# Patient Record
Sex: FEMALE | Race: WHITE | NOT HISPANIC OR LATINO | URBAN - METROPOLITAN AREA
[De-identification: names, ages, dates, MRNs, and addresses within clinical notes are randomized per-mention and may not be internally consistent; named-entity substitution may affect disease eponyms.]

---

## 2020-07-25 ENCOUNTER — TELEPHONE ENCOUNTER (OUTPATIENT)
Dept: URBAN - METROPOLITAN AREA CLINIC 13 | Facility: CLINIC | Age: 65
End: 2020-07-25

## 2020-07-25 RX ORDER — BENAZEPRIL HYDROCHLORIDE 10 MG/1
TAKE 1 TABLET DAILY TABLET, COATED ORAL
Refills: 0 | OUTPATIENT
Start: 2014-09-24 | End: 2015-09-14

## 2020-07-25 RX ORDER — LORAZEPAM 2 MG/1
TAKE 1 TABLET DAILY TABLET ORAL
Qty: 30 | Refills: 0 | OUTPATIENT
Start: 2014-08-02 | End: 2015-09-14

## 2020-07-25 RX ORDER — ALPRAZOLAM 2 MG/1
TAKE 1 TABLET DAILY AT BEDTIME TABLET ORAL
Refills: 0 | OUTPATIENT
Start: 2007-06-21 | End: 2015-07-08

## 2020-07-25 RX ORDER — METRONIDAZOLE 500 MG/1
TAKE 1 TABLET TWICE DAILY TABLET ORAL
Qty: 28 | Refills: 0 | OUTPATIENT
Start: 2015-07-17 | End: 2015-09-14

## 2020-07-26 ENCOUNTER — TELEPHONE ENCOUNTER (OUTPATIENT)
Dept: URBAN - METROPOLITAN AREA CLINIC 13 | Facility: CLINIC | Age: 65
End: 2020-07-26

## 2020-07-26 RX ORDER — PREDNISONE 20 MG/1
TABLET ORAL
Qty: 14 | Refills: 0 | Status: ACTIVE | COMMUNITY
Start: 2015-02-02

## 2020-07-26 RX ORDER — HYDROCODONE BITARTRATE AND HOMATROPINE METHYLBROMIDE 5; 1.5 MG/5ML; MG/5ML
SYRUP ORAL
Qty: 240 | Refills: 0 | Status: ACTIVE | COMMUNITY
Start: 2014-10-03

## 2020-07-26 RX ORDER — IBUPROFEN 800 MG/1
TABLET ORAL
Qty: 60 | Refills: 0 | Status: ACTIVE | COMMUNITY
Start: 2015-02-07

## 2020-07-26 RX ORDER — PREDNISONE 20 MG/1
TABLET ORAL
Qty: 14 | Refills: 0 | Status: ACTIVE | COMMUNITY
Start: 2014-10-28

## 2020-07-26 RX ORDER — HYDROCODONE BITARTRATE AND HOMATROPINE METHYLBROMIDE 5; 1.5 MG/5ML; MG/5ML
SYRUP ORAL
Qty: 180 | Refills: 0 | Status: ACTIVE | COMMUNITY
Start: 2014-10-28

## 2020-07-26 RX ORDER — IBUPROFEN 800 MG/1
TABLET ORAL
Qty: 90 | Refills: 0 | Status: ACTIVE | COMMUNITY
Start: 2015-05-20

## 2020-07-26 RX ORDER — PREDNISONE 20 MG/1
TABLET ORAL
Qty: 14 | Refills: 0 | Status: ACTIVE | COMMUNITY
Start: 2014-11-17

## 2020-07-26 RX ORDER — HYOSCYAMINE SULFATE 0.12 MG/1
TABLET ORAL
Qty: 60 | Refills: 0 | Status: ACTIVE | COMMUNITY
Start: 2015-06-15

## 2020-07-26 RX ORDER — DOXYCYCLINE 100 MG/1
CAPSULE ORAL
Qty: 20 | Refills: 0 | Status: ACTIVE | COMMUNITY
Start: 2014-10-28

## 2020-07-26 RX ORDER — VALACYCLOVIR HYDROCHLORIDE 500 MG/1
TABLET, FILM COATED ORAL
Qty: 30 | Refills: 0 | Status: ACTIVE | COMMUNITY
Start: 2014-07-17

## 2020-07-26 RX ORDER — OXYCODONE AND ACETAMINOPHEN 5; 325 MG/1; MG/1
TABLET ORAL
Qty: 48 | Refills: 0 | Status: ACTIVE | COMMUNITY
Start: 2015-02-07

## 2020-07-26 RX ORDER — IBUPROFEN 800 MG/1
TABLET ORAL
Qty: 90 | Refills: 0 | Status: ACTIVE | COMMUNITY
Start: 2015-04-12

## 2020-07-26 RX ORDER — CIPROFLOXACIN HYDROCHLORIDE 500 MG/1
TABLET, FILM COATED ORAL
Qty: 20 | Refills: 0 | Status: ACTIVE | COMMUNITY
Start: 2014-10-02

## 2020-07-26 RX ORDER — SULFAMETHOXAZOLE AND TRIMETHOPRIM 800; 160 MG/1; MG/1
TABLET ORAL
Qty: 14 | Refills: 0 | Status: ACTIVE | COMMUNITY
Start: 2015-04-13

## 2020-07-26 RX ORDER — BENZONATATE 200 MG/1
CAPSULE ORAL
Qty: 30 | Refills: 0 | Status: ACTIVE | COMMUNITY
Start: 2015-01-16

## 2020-07-26 RX ORDER — FLUOXETINE 20 MG/1
TAKE 1 CAPSULE DAILY CAPSULE ORAL
Refills: 0 | Status: ACTIVE | COMMUNITY
Start: 2014-10-17

## 2020-07-26 RX ORDER — CIPROFLOXACIN HYDROCHLORIDE 500 MG/1
TABLET, FILM COATED ORAL
Qty: 28 | Refills: 0 | Status: ACTIVE | COMMUNITY
Start: 2014-11-17

## 2020-07-26 RX ORDER — CIPROFLOXACIN HYDROCHLORIDE 500 MG/1
TABLET, FILM COATED ORAL
Qty: 14 | Refills: 0 | Status: ACTIVE | COMMUNITY
Start: 2014-10-14

## 2020-07-26 RX ORDER — OXYCODONE AND ACETAMINOPHEN 5; 325 MG/1; MG/1
TABLET ORAL
Qty: 30 | Refills: 0 | Status: ACTIVE | COMMUNITY
Start: 2015-02-22

## 2020-07-26 RX ORDER — CYCLOBENZAPRINE HYDROCHLORIDE 5 MG/1
TABLET, FILM COATED ORAL
Qty: 60 | Refills: 0 | Status: ACTIVE | COMMUNITY
Start: 2015-02-07

## 2020-07-26 RX ORDER — TRIAMTERENE AND HYDROCHLOROTHIAZIDE 37.5; 25 MG/1; MG/1
TABLET ORAL
Qty: 90 | Refills: 0 | Status: ACTIVE | COMMUNITY
Start: 2015-02-02

## 2020-07-26 RX ORDER — CLINDAMYCIN HYDROCHLORIDE 300 MG/1
CAPSULE ORAL
Qty: 21 | Refills: 0 | Status: ACTIVE | COMMUNITY
Start: 2014-09-24

## 2020-07-26 RX ORDER — TOPIRAMATE 50 MG/1
TAKE 1 TABLET DAILY TABLET, FILM COATED ORAL
Refills: 0 | Status: ACTIVE | COMMUNITY
Start: 2015-04-13

## 2020-07-26 RX ORDER — CLONAZEPAM 1 MG/1
TABLET ORAL
Qty: 60 | Refills: 0 | Status: ACTIVE | COMMUNITY
Start: 2014-09-24

## 2020-07-26 RX ORDER — AZITHROMYCIN DIHYDRATE 250 MG/1
TABLET, FILM COATED ORAL
Qty: 6 | Refills: 0 | Status: ACTIVE | COMMUNITY
Start: 2015-05-04

## 2020-07-26 RX ORDER — BENZONATATE 100 MG/1
CAPSULE ORAL
Qty: 30 | Refills: 0 | Status: ACTIVE | COMMUNITY
Start: 2015-04-13

## 2020-07-26 RX ORDER — OMEPRAZOLE 40 MG/1
TAKE 1 CAPSULE DAILY CAPSULE, DELAYED RELEASE ORAL
Refills: 0 | Status: ACTIVE | COMMUNITY
Start: 2015-02-12

## 2022-11-18 ENCOUNTER — WEB ENCOUNTER (OUTPATIENT)
Dept: URBAN - METROPOLITAN AREA CLINIC 113 | Facility: CLINIC | Age: 67
End: 2022-11-18

## 2022-11-18 ENCOUNTER — OFFICE VISIT (OUTPATIENT)
Dept: URBAN - METROPOLITAN AREA CLINIC 113 | Facility: CLINIC | Age: 67
End: 2022-11-18
Payer: MEDICARE

## 2022-11-18 ENCOUNTER — LAB OUTSIDE AN ENCOUNTER (OUTPATIENT)
Dept: URBAN - METROPOLITAN AREA CLINIC 113 | Facility: CLINIC | Age: 67
End: 2022-11-18

## 2022-11-18 VITALS
RESPIRATION RATE: 22 BRPM | WEIGHT: 293 LBS | DIASTOLIC BLOOD PRESSURE: 96 MMHG | TEMPERATURE: 97.1 F | SYSTOLIC BLOOD PRESSURE: 174 MMHG | HEART RATE: 91 BPM | BODY MASS INDEX: 48.82 KG/M2 | HEIGHT: 65 IN

## 2022-11-18 DIAGNOSIS — Z12.11 COLON CANCER SCREENING: ICD-10-CM

## 2022-11-18 DIAGNOSIS — R09.89 CHRONIC THROAT CLEARING: ICD-10-CM

## 2022-11-18 DIAGNOSIS — K21.9 GASTROESOPHAGEAL REFLUX DISEASE WITHOUT ESOPHAGITIS: ICD-10-CM

## 2022-11-18 PROCEDURE — 99214 OFFICE O/P EST MOD 30 MIN: CPT | Performed by: NURSE PRACTITIONER

## 2022-11-18 RX ORDER — FAMOTIDINE 40 MG/1
1 TABLET AT BEDTIME TABLET, FILM COATED ORAL ONCE A DAY
Status: ACTIVE | COMMUNITY

## 2022-11-18 RX ORDER — DICYCLOMINE HYDROCHLORIDE 10 MG/1
2 CAPSULES CAPSULE ORAL THREE TIMES A DAY
Status: ACTIVE | COMMUNITY

## 2022-11-18 RX ORDER — IBUPROFEN 800 MG/1
TABLET ORAL
Qty: 60 | Refills: 0 | Status: DISCONTINUED | COMMUNITY
Start: 2015-02-07

## 2022-11-18 RX ORDER — LOVASTATIN 20 MG/1
1 TABLET WITH THE EVENING MEAL TABLET ORAL ONCE A DAY
Status: ACTIVE | COMMUNITY

## 2022-11-18 RX ORDER — DOXYCYCLINE 100 MG/1
CAPSULE ORAL
Qty: 20 | Refills: 0 | Status: DISCONTINUED | COMMUNITY
Start: 2014-10-28

## 2022-11-18 RX ORDER — IBANDRONATE SODIUM 150 MG/1
1 TABLET TABLET, FILM COATED ORAL
Status: ACTIVE | COMMUNITY

## 2022-11-18 RX ORDER — HYDROCHLOROTHIAZIDE 25 MG/1
1 TABLET IN THE MORNING TABLET ORAL ONCE A DAY
Status: ACTIVE | COMMUNITY

## 2022-11-18 RX ORDER — OXYCODONE AND ACETAMINOPHEN 5; 325 MG/1; MG/1
TABLET ORAL
Qty: 48 | Refills: 0 | Status: DISCONTINUED | COMMUNITY
Start: 2015-02-07

## 2022-11-18 RX ORDER — FLUTICASONE PROPIONATE 50 UG/1
1 SPRAY IN EACH NOSTRIL SPRAY, METERED NASAL ONCE A DAY
Status: ACTIVE | COMMUNITY

## 2022-11-18 RX ORDER — OMEPRAZOLE 40 MG/1
TAKE 1 CAPSULE DAILY CAPSULE, DELAYED RELEASE ORAL
Refills: 0 | Status: DISCONTINUED | COMMUNITY
Start: 2015-02-12

## 2022-11-18 RX ORDER — FLUOXETINE 20 MG/1
TAKE 1 CAPSULE DAILY CAPSULE ORAL
Refills: 0 | Status: DISCONTINUED | COMMUNITY
Start: 2014-10-17

## 2022-11-18 RX ORDER — CYCLOBENZAPRINE HYDROCHLORIDE 5 MG/1
TABLET, FILM COATED ORAL
Qty: 60 | Refills: 0 | Status: DISCONTINUED | COMMUNITY
Start: 2015-02-07

## 2022-11-18 RX ORDER — BENZONATATE 200 MG/1
CAPSULE ORAL
Qty: 30 | Refills: 0 | Status: DISCONTINUED | COMMUNITY
Start: 2015-01-16

## 2022-11-18 RX ORDER — AZELASTINE HYDROCHLORIDE AND FLUTICASONE PROPIONATE 137; 50 UG/1; UG/1
1 SPRAY IN EACH NOSTRIL SPRAY, METERED NASAL TWICE A DAY
Status: ACTIVE | COMMUNITY

## 2022-11-18 RX ORDER — AZITHROMYCIN DIHYDRATE 250 MG/1
TABLET, FILM COATED ORAL
Qty: 6 | Refills: 0 | Status: DISCONTINUED | COMMUNITY
Start: 2015-05-04

## 2022-11-18 RX ORDER — AMLODIPINE BESYLATE 2.5 MG/1
1 TABLET TABLET ORAL ONCE A DAY
Status: ACTIVE | COMMUNITY

## 2022-11-18 RX ORDER — CLONAZEPAM 1 MG/1
TABLET ORAL
Qty: 60 | Refills: 0 | Status: DISCONTINUED | COMMUNITY
Start: 2014-09-24

## 2022-11-18 RX ORDER — HYDROCODONE BITARTRATE AND HOMATROPINE METHYLBROMIDE 5; 1.5 MG/5ML; MG/5ML
SYRUP ORAL
Qty: 240 | Refills: 0 | Status: DISCONTINUED | COMMUNITY
Start: 2014-10-03

## 2022-11-18 RX ORDER — PREDNISONE 20 MG/1
TABLET ORAL
Qty: 14 | Refills: 0 | Status: DISCONTINUED | COMMUNITY
Start: 2014-10-28

## 2022-11-18 RX ORDER — BENZONATATE 100 MG/1
CAPSULE ORAL
Qty: 30 | Refills: 0 | Status: DISCONTINUED | COMMUNITY
Start: 2015-04-13

## 2022-11-18 RX ORDER — TRIAMTERENE AND HYDROCHLOROTHIAZIDE 37.5; 25 MG/1; MG/1
TABLET ORAL
Qty: 90 | Refills: 0 | Status: DISCONTINUED | COMMUNITY
Start: 2015-02-02

## 2022-11-18 RX ORDER — SULFAMETHOXAZOLE AND TRIMETHOPRIM 800; 160 MG/1; MG/1
TABLET ORAL
Qty: 14 | Refills: 0 | Status: DISCONTINUED | COMMUNITY
Start: 2015-04-13

## 2022-11-18 RX ORDER — HYOSCYAMINE SULFATE 0.12 MG/1
TABLET ORAL
Qty: 60 | Refills: 0 | Status: DISCONTINUED | COMMUNITY
Start: 2015-06-15

## 2022-11-18 RX ORDER — TOPIRAMATE 50 MG/1
TAKE 1 TABLET DAILY TABLET, FILM COATED ORAL
Refills: 0 | Status: DISCONTINUED | COMMUNITY
Start: 2015-04-13

## 2022-11-18 RX ORDER — CLINDAMYCIN HYDROCHLORIDE 300 MG/1
CAPSULE ORAL
Qty: 21 | Refills: 0 | Status: DISCONTINUED | COMMUNITY
Start: 2014-09-24

## 2022-11-18 RX ORDER — VALACYCLOVIR HYDROCHLORIDE 500 MG/1
TABLET, FILM COATED ORAL
Qty: 30 | Refills: 0 | Status: DISCONTINUED | COMMUNITY
Start: 2014-07-17

## 2022-11-18 RX ORDER — TOPIRAMATE 50 MG/1
1 TABLET TABLET, FILM COATED ORAL ONCE A DAY
Status: ACTIVE | COMMUNITY

## 2022-11-18 RX ORDER — CIPROFLOXACIN HYDROCHLORIDE 500 MG/1
TABLET, FILM COATED ORAL
Qty: 20 | Refills: 0 | Status: DISCONTINUED | COMMUNITY
Start: 2014-10-02

## 2022-11-18 RX ORDER — MONTELUKAST 10 MG/1
1 TABLET TABLET, FILM COATED ORAL ONCE A DAY
Status: ACTIVE | COMMUNITY

## 2022-11-18 RX ORDER — OMEPRAZOLE 40 MG/1
1 CAPSULE 30 MINUTES BEFORE MORNING MEAL CAPSULE, DELAYED RELEASE ORAL EVERY OTHER DAY
Status: ACTIVE | COMMUNITY

## 2022-11-18 NOTE — HPI-TODAY'S VISIT:
This is a 67-year-old female with a history of hypertension, anxiety, colon diverticulosis, and diarrhea predominant irritable bowel syndrome presenting after a long interval for evaluation of acid reflux and mucus in her throat. She was last seen in 2015 for worsening diarrhea in the setting of diarrhea predominant IBS.  She had been treated presumptively for C. difficile with Flagyl and had persistent diarrhea.  A colonoscopy was previously recommended and was  to be rescheduled but was not performed.  She did not return for follow-up.  She states that she has been doing well until August.  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  She contracted it and had recurrence resulting in bronchiolitis and pneumonia.  She has been on steroids and antibiotics intermittently for the last 3 months.  She has been under the care of Dr. Maguire.  She is here because she has been frequently clearing her throat.  She feels as though there is a "pool of phlegm" in the bottom of her throat.  This began with respiratory issues.  In the mornings, she coughs producing a small amount of yellow mucus.  She states his mucus is difficult to produce.  Occasionally, throat clearing is worse with meals and occasionally at night she awakens and cannot stop coughing.  There is discussion this may be associated with acid reflux.  She has been evaluated by other physicians in order to ascertain a problem and has had a recent CT scan of her sinuses at [facility] revealing a deviated septum.  She has undergone a negative barium swallow per her primary care physician.  She denies esophageal symptoms of dysphagia but occasionally feels as though she may choke or that her throat is closing.  She is taking omeprazole 40 mg daily and has been on this chronically.  She denies heartburn or regurgitation.  She rarely has diarrhea.  She denies any other abdominal symptoms. She has recently been evaluated for abnormal renal function.  She is seeing Dr. Calabrese.  Her primary care physician stopped Voltaren topical, Boniva, and decreased omeprazole to every other day. She reports a normal colonoscopy at [facility] 5 years ago. She provides labs on her phone 11/7/2022:CBC: WBC 6.7, hemoglobin 14.1, MCV 91, platelet 263.  TSH 3.650.

## 2022-11-18 NOTE — HPI-OTHER HISTORIES
11/7/2022:CBC: WBC 6.7, hemoglobin 14.1, MCV 91, platelet 263.  TSH 3.650.Labs:  Colonoscopy 4/20/2007:Colon diverticulosis, otherwise normal to the terminal ileum.

## 2022-11-19 PROBLEM — 248589007: Status: ACTIVE | Noted: 2022-11-18

## 2022-11-19 PROBLEM — 266435005: Status: ACTIVE | Noted: 2022-11-18

## 2022-11-19 PROBLEM — 275978004 COLON CANCER SCREENING: Status: ACTIVE | Noted: 2022-11-19

## 2022-12-19 ENCOUNTER — OFFICE VISIT (OUTPATIENT)
Dept: URBAN - METROPOLITAN AREA MEDICAL CENTER 2 | Facility: MEDICAL CENTER | Age: 67
End: 2022-12-19
Payer: MEDICARE

## 2022-12-19 ENCOUNTER — CLAIMS CREATED FROM THE CLAIM WINDOW (OUTPATIENT)
Dept: URBAN - METROPOLITAN AREA MEDICAL CENTER 2 | Facility: MEDICAL CENTER | Age: 67
End: 2022-12-19

## 2022-12-19 DIAGNOSIS — K21.9 ACID REFLUX: ICD-10-CM

## 2022-12-19 DIAGNOSIS — K21.00 ALKALINE REFLUX ESOPHAGITIS: ICD-10-CM

## 2022-12-19 PROCEDURE — 91035 G-ESOPH REFLX TST W/ELECTROD: CPT | Performed by: INTERNAL MEDICINE

## 2022-12-19 PROCEDURE — 43235 EGD DIAGNOSTIC BRUSH WASH: CPT | Performed by: INTERNAL MEDICINE

## 2023-03-16 ENCOUNTER — WEB ENCOUNTER (OUTPATIENT)
Dept: URBAN - METROPOLITAN AREA CLINIC 113 | Facility: CLINIC | Age: 68
End: 2023-03-16

## 2023-03-20 ENCOUNTER — DASHBOARD ENCOUNTERS (OUTPATIENT)
Age: 68
End: 2023-03-20

## 2023-03-20 ENCOUNTER — OFFICE VISIT (OUTPATIENT)
Dept: URBAN - METROPOLITAN AREA CLINIC 113 | Facility: CLINIC | Age: 68
End: 2023-03-20
Payer: MEDICARE

## 2023-03-20 VITALS
DIASTOLIC BLOOD PRESSURE: 84 MMHG | HEIGHT: 65 IN | SYSTOLIC BLOOD PRESSURE: 148 MMHG | RESPIRATION RATE: 20 BRPM | BODY MASS INDEX: 48.82 KG/M2 | WEIGHT: 293 LBS | HEART RATE: 80 BPM | TEMPERATURE: 97.1 F

## 2023-03-20 DIAGNOSIS — K21.9 GASTROESOPHAGEAL REFLUX DISEASE WITHOUT ESOPHAGITIS: ICD-10-CM

## 2023-03-20 DIAGNOSIS — R09.89 CHRONIC THROAT CLEARING: ICD-10-CM

## 2023-03-20 DIAGNOSIS — Z12.11 COLON CANCER SCREENING: ICD-10-CM

## 2023-03-20 PROCEDURE — 99213 OFFICE O/P EST LOW 20 MIN: CPT | Performed by: INTERNAL MEDICINE

## 2023-03-20 RX ORDER — HYDROCHLOROTHIAZIDE 25 MG/1
1 TABLET IN THE MORNING TABLET ORAL ONCE A DAY
Status: ACTIVE | COMMUNITY

## 2023-03-20 RX ORDER — FLUTICASONE PROPIONATE 50 UG/1
1 SPRAY IN EACH NOSTRIL SPRAY, METERED NASAL ONCE A DAY
Status: ON HOLD | COMMUNITY

## 2023-03-20 RX ORDER — AMLODIPINE BESYLATE 2.5 MG/1
1 TABLET TABLET ORAL ONCE A DAY
Status: ACTIVE | COMMUNITY

## 2023-03-20 RX ORDER — DICYCLOMINE HYDROCHLORIDE 10 MG/1
2 CAPSULES CAPSULE ORAL THREE TIMES A DAY
Status: ACTIVE | COMMUNITY

## 2023-03-20 RX ORDER — LOVASTATIN 20 MG/1
1 TABLET WITH THE EVENING MEAL TABLET ORAL ONCE A DAY
Status: ACTIVE | COMMUNITY

## 2023-03-20 RX ORDER — TOPIRAMATE 50 MG/1
1 TABLET TABLET, FILM COATED ORAL ONCE A DAY
Status: ACTIVE | COMMUNITY

## 2023-03-20 RX ORDER — AZELASTINE HYDROCHLORIDE AND FLUTICASONE PROPIONATE 137; 50 UG/1; UG/1
1 SPRAY IN EACH NOSTRIL SPRAY, METERED NASAL TWICE A DAY
Status: ACTIVE | COMMUNITY

## 2023-03-20 RX ORDER — OMEPRAZOLE 40 MG/1
1 CAPSULE 30 MINUTES BEFORE MORNING MEAL CAPSULE, DELAYED RELEASE ORAL EVERY OTHER DAY
Status: ACTIVE | COMMUNITY

## 2023-03-20 RX ORDER — IBANDRONATE SODIUM 150 MG/1
1 TABLET TABLET, FILM COATED ORAL
Status: ACTIVE | COMMUNITY

## 2023-03-20 RX ORDER — MONTELUKAST 10 MG/1
1 TABLET TABLET, FILM COATED ORAL ONCE A DAY
Status: ACTIVE | COMMUNITY

## 2023-03-20 RX ORDER — FAMOTIDINE 40 MG/1
1 TABLET AT BEDTIME TABLET, FILM COATED ORAL ONCE A DAY
Status: ACTIVE | COMMUNITY

## 2023-03-20 NOTE — HPI-TODAY'S VISIT:
This is a 68-year-old female with a history of hypertension, anxiety, colon diverticulosis, and diarrhea predominant irritable bowel syndrome presenting  for follow up evaluation of acid reflux and mucus in her throat.  She was last seen here on 11/18/22.   She was last seen here in 2015 for worsening diarrhea in the setting of diarrhea predominant IBS.  She had been treated presumptively for C. difficile with Flagyl and had persistent diarrhea.  A colonoscopy was previously recommended and was  to be rescheduled but was not performed.  She did not return for follow-up.  The patient subsequently had a colonoscopy done in 2017 by Dr. Corona which was read as normal.  A 10-year follow-up examination was recommended.  She states that she has been doing well until August 2022.  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  She contracted it and had recurrence resulting in bronchiolitis and pneumonia.  She has been on steroids and antibiotics intermittently for the subsequent 3 months under the care of Dr. Fidelia Maguire.  She is here because she has been frequently clearing her throat.  At her last visit, she ntoed a sensation as though there was a "pool of phlegm" in the bottom of her throat.  This began with the onset of her respiratory issues.  In the mornings, she would cough, producing a small amount of yellow mucus.   Occasionally, throat clearing was worse with meals, and occasionally she would awaken at night with intractable coughing.  There was discussion this may be associated with acid reflux.    She has been evaluated by other physicians in order to ascertain a the source of her problem and had a recent CT scan of her sinuses at [facility] revealing a deviated septum.  She has undergone a negative barium swallow per her primary care physician.  She denied esophageal symptoms of dysphagia but occasionally felt as though she might choke  on food.  She was taking omeprazole 40 mg daily chronically.  She denied heartburn or regurgitation.  She rarely has diarrhea.  She denies any other abdominal symptoms.  She has recently been evaluated for abnormal renal function.  She is seeing Dr. Calabrese.  Her primary care physician stopped Voltaren topical, Boniva, and decreased omeprazole to every other day.  She had labs done on 11/7/2022:CBC: WBC 6.7, hemoglobin 14.1, MCV 91, platelet 263.  TSH 3.650.  After her initial visit here on 11/18/2022, the patient was scheduled for an upper GI endoscopy with Bravo pH probe placement.  This was done on December 19, 2022.  The Bravo study was equivocal, with a pH of less than for a total of 3.6% of the time, and a DeMeester reflux score of 15.3.  There was 1 isolated reflux episode noted.  The upper endoscopy itself was notable for LA grade A reflux esophagitis.  The patient had COVID-19 after her last visit, and this increased her pulmonary symptoms transiently.  She is now better.  She is still having some phlegm production and excessive throat clearing, but it is not as bad.  She is now on CPAP, which seems to help.  Her voice is still raspy.  She is not having michael regurgitation, dysphagia, odynophagia, or nausea and vomiting.  She has no other GI complaints at this time.

## 2023-03-20 NOTE — PHYSICAL EXAM GASTROINTESTINAL
Abdomen , soft, nontender, nondistended , no guarding or rigidity , no masses palpable , normal bowel sounds , obese; Liver and Spleen , no hepatosplenomegaly

## 2023-05-27 ENCOUNTER — CLAIMS CREATED FROM THE CLAIM WINDOW (OUTPATIENT)
Dept: URBAN - METROPOLITAN AREA MEDICAL CENTER 19 | Facility: MEDICAL CENTER | Age: 68
End: 2023-05-27
Payer: MEDICARE

## 2023-05-27 DIAGNOSIS — K76.0 HEPATIC STEATOSIS: ICD-10-CM

## 2023-05-27 DIAGNOSIS — K85.10 BILIARY ACUTE PANCREATITIS WITHOUT NECROSIS OR INFECTION: ICD-10-CM

## 2023-05-27 DIAGNOSIS — R74.01 ABNORMAL/ELEVATED TRANSAMINASE (SGOT, AMINOTRANSFERASE): ICD-10-CM

## 2023-05-27 DIAGNOSIS — R74.8 ABNORMAL ALKALINE PHOSPHATASE TEST: ICD-10-CM

## 2023-05-27 PROCEDURE — 99222 1ST HOSP IP/OBS MODERATE 55: CPT | Performed by: INTERNAL MEDICINE
